# Patient Record
Sex: MALE | Race: WHITE | Employment: UNEMPLOYED | URBAN - METROPOLITAN AREA
[De-identification: names, ages, dates, MRNs, and addresses within clinical notes are randomized per-mention and may not be internally consistent; named-entity substitution may affect disease eponyms.]

---

## 2020-12-19 ENCOUNTER — HOSPITAL ENCOUNTER (INPATIENT)
Age: 20
LOS: 3 days | Discharge: HOME | DRG: 137 | End: 2020-12-22
Attending: EMERGENCY MEDICINE | Admitting: STUDENT IN AN ORGANIZED HEALTH CARE EDUCATION/TRAINING PROGRAM
Payer: MEDICAID

## 2020-12-19 DIAGNOSIS — R45.851 SUICIDAL IDEATION: Primary | ICD-10-CM

## 2020-12-19 DIAGNOSIS — U07.1 COVID-19: ICD-10-CM

## 2020-12-19 LAB
AMPHET UR QL SCN: NEGATIVE
ANION GAP SERPL CALC-SCNC: 5 MMOL/L (ref 3–18)
BARBITURATES UR QL SCN: NEGATIVE
BASOPHILS # BLD: 0 K/UL (ref 0–0.1)
BASOPHILS NFR BLD: 0 % (ref 0–2)
BENZODIAZ UR QL: NEGATIVE
BUN SERPL-MCNC: 16 MG/DL (ref 7–18)
BUN/CREAT SERPL: 21 (ref 12–20)
CALCIUM SERPL-MCNC: 9.1 MG/DL (ref 8.5–10.1)
CANNABINOIDS UR QL SCN: NEGATIVE
CHLORIDE SERPL-SCNC: 109 MMOL/L (ref 100–111)
CO2 SERPL-SCNC: 26 MMOL/L (ref 21–32)
COCAINE UR QL SCN: NEGATIVE
COVID-19 RAPID TEST, COVR: DETECTED
CREAT SERPL-MCNC: 0.75 MG/DL (ref 0.6–1.3)
DIFFERENTIAL METHOD BLD: ABNORMAL
EOSINOPHIL # BLD: 0.1 K/UL (ref 0–0.4)
EOSINOPHIL NFR BLD: 1 % (ref 0–5)
ERYTHROCYTE [DISTWIDTH] IN BLOOD BY AUTOMATED COUNT: 12.4 % (ref 11.6–14.5)
ETHANOL SERPL-MCNC: <3 MG/DL (ref 0–3)
GLUCOSE SERPL-MCNC: 93 MG/DL (ref 74–99)
HCT VFR BLD AUTO: 46.7 % (ref 36–48)
HDSCOM,HDSCOM: NORMAL
HGB BLD-MCNC: 16.3 G/DL (ref 13–16)
LYMPHOCYTES # BLD: 3.1 K/UL (ref 0.9–3.6)
LYMPHOCYTES NFR BLD: 47 % (ref 21–52)
MCH RBC QN AUTO: 33.1 PG (ref 24–34)
MCHC RBC AUTO-ENTMCNC: 34.9 G/DL (ref 31–37)
MCV RBC AUTO: 94.7 FL (ref 74–97)
METHADONE UR QL: NEGATIVE
MONOCYTES # BLD: 0.5 K/UL (ref 0.05–1.2)
MONOCYTES NFR BLD: 8 % (ref 3–10)
NEUTS SEG # BLD: 3 K/UL (ref 1.8–8)
NEUTS SEG NFR BLD: 44 % (ref 40–73)
OPIATES UR QL: NEGATIVE
PCP UR QL: NEGATIVE
PLATELET # BLD AUTO: 224 K/UL (ref 135–420)
PMV BLD AUTO: 11.2 FL (ref 9.2–11.8)
POTASSIUM SERPL-SCNC: 3.8 MMOL/L (ref 3.5–5.5)
RBC # BLD AUTO: 4.93 M/UL (ref 4.7–5.5)
SODIUM SERPL-SCNC: 140 MMOL/L (ref 136–145)
SOURCE, COVRS: ABNORMAL
SPECIMEN TYPE, XMCV1T: ABNORMAL
VALPROATE SERPL-MCNC: 75 UG/ML (ref 50–100)
WBC # BLD AUTO: 6.7 K/UL (ref 4.6–13.2)

## 2020-12-19 PROCEDURE — 80048 BASIC METABOLIC PNL TOTAL CA: CPT

## 2020-12-19 PROCEDURE — 80164 ASSAY DIPROPYLACETIC ACD TOT: CPT

## 2020-12-19 PROCEDURE — 99223 1ST HOSP IP/OBS HIGH 75: CPT | Performed by: STUDENT IN AN ORGANIZED HEALTH CARE EDUCATION/TRAINING PROGRAM

## 2020-12-19 PROCEDURE — 85025 COMPLETE CBC W/AUTO DIFF WBC: CPT

## 2020-12-19 PROCEDURE — 87635 SARS-COV-2 COVID-19 AMP PRB: CPT

## 2020-12-19 PROCEDURE — 74011250637 HC RX REV CODE- 250/637: Performed by: STUDENT IN AN ORGANIZED HEALTH CARE EDUCATION/TRAINING PROGRAM

## 2020-12-19 PROCEDURE — 99284 EMERGENCY DEPT VISIT MOD MDM: CPT

## 2020-12-19 PROCEDURE — 65660000000 HC RM CCU STEPDOWN

## 2020-12-19 PROCEDURE — 80307 DRUG TEST PRSMV CHEM ANLYZR: CPT

## 2020-12-19 RX ORDER — QUETIAPINE FUMARATE 25 MG/1
25 TABLET, FILM COATED ORAL
COMMUNITY
End: 2020-12-22

## 2020-12-19 RX ORDER — ENOXAPARIN SODIUM 100 MG/ML
40 INJECTION SUBCUTANEOUS DAILY
Status: DISCONTINUED | OUTPATIENT
Start: 2020-12-20 | End: 2020-12-22 | Stop reason: HOSPADM

## 2020-12-19 RX ORDER — PROMETHAZINE HYDROCHLORIDE 25 MG/1
12.5 TABLET ORAL
Status: DISCONTINUED | OUTPATIENT
Start: 2020-12-19 | End: 2020-12-22 | Stop reason: HOSPADM

## 2020-12-19 RX ORDER — SODIUM CHLORIDE 0.9 % (FLUSH) 0.9 %
5-40 SYRINGE (ML) INJECTION AS NEEDED
Status: DISCONTINUED | OUTPATIENT
Start: 2020-12-19 | End: 2020-12-22 | Stop reason: HOSPADM

## 2020-12-19 RX ORDER — QUETIAPINE FUMARATE 25 MG/1
25 TABLET, FILM COATED ORAL
Status: DISCONTINUED | OUTPATIENT
Start: 2020-12-19 | End: 2020-12-20

## 2020-12-19 RX ORDER — SERTRALINE HYDROCHLORIDE 50 MG/1
150 TABLET, FILM COATED ORAL EVERY EVENING
Status: DISCONTINUED | OUTPATIENT
Start: 2020-12-19 | End: 2020-12-22 | Stop reason: HOSPADM

## 2020-12-19 RX ORDER — ACETAMINOPHEN 650 MG/1
650 SUPPOSITORY RECTAL
Status: DISCONTINUED | OUTPATIENT
Start: 2020-12-19 | End: 2020-12-22 | Stop reason: HOSPADM

## 2020-12-19 RX ORDER — POLYETHYLENE GLYCOL 3350 17 G/17G
17 POWDER, FOR SOLUTION ORAL DAILY PRN
Status: DISCONTINUED | OUTPATIENT
Start: 2020-12-19 | End: 2020-12-22 | Stop reason: HOSPADM

## 2020-12-19 RX ORDER — ACETAMINOPHEN 325 MG/1
650 TABLET ORAL
Status: DISCONTINUED | OUTPATIENT
Start: 2020-12-19 | End: 2020-12-22 | Stop reason: HOSPADM

## 2020-12-19 RX ORDER — ONDANSETRON 2 MG/ML
4 INJECTION INTRAMUSCULAR; INTRAVENOUS
Status: DISCONTINUED | OUTPATIENT
Start: 2020-12-19 | End: 2020-12-22 | Stop reason: HOSPADM

## 2020-12-19 RX ORDER — DIVALPROEX SODIUM 500 MG/1
1500 TABLET, DELAYED RELEASE ORAL
Status: DISCONTINUED | OUTPATIENT
Start: 2020-12-19 | End: 2020-12-20

## 2020-12-19 RX ORDER — SODIUM CHLORIDE 0.9 % (FLUSH) 0.9 %
5-40 SYRINGE (ML) INJECTION EVERY 8 HOURS
Status: DISCONTINUED | OUTPATIENT
Start: 2020-12-19 | End: 2020-12-22 | Stop reason: HOSPADM

## 2020-12-19 RX ADMIN — Medication 10 ML: at 22:00

## 2020-12-19 RX ADMIN — DIVALPROEX SODIUM 1500 MG: 500 TABLET, DELAYED RELEASE ORAL at 22:00

## 2020-12-19 RX ADMIN — QUETIAPINE FUMARATE 25 MG: 25 TABLET ORAL at 21:01

## 2020-12-19 RX ADMIN — Medication 5 ML: at 17:31

## 2020-12-19 RX ADMIN — SERTRALINE HYDROCHLORIDE 150 MG: 50 TABLET ORAL at 18:31

## 2020-12-19 NOTE — ED PROVIDER NOTES
EMERGENCY DEPARTMENT HISTORY AND PHYSICAL EXAM    2:32 PM      Date: 12/19/2020  Patient Name: Hilda Sotelo    History of Presenting Illness     Chief Complaint   Patient presents with    Suicidal         History Provided By: Patient    Additional History (Context): Hilda Sotelo is a 23 y.o. male with hx of depression, anxiety who presents with suicidal ideation x weeks that became worse today. Patient notes his mother told him he cannot move back to [place] today, which caused increased suicidal ideation. Patient's plan would be to hang himself. Patient has history of self-harm in the past.  Patient denies HI, visual or auditory hallucinations, alcohol or drug use. Denies sick contacts, known exposure to [place]. PCP: None      Past History     Past Medical History:  No past medical history on file. Past Surgical History:  No past surgical history on file. Family History:  No family history on file. Social History:  Social History     Tobacco Use    Smoking status: Not on file   Substance Use Topics    Alcohol use: Not on file    Drug use: Not on file       Allergies:  No Known Allergies      Review of Systems       Review of Systems   Constitutional: Negative for chills and fever. Respiratory: Negative for shortness of breath. Cardiovascular: Negative for chest pain. Gastrointestinal: Negative for abdominal pain, nausea and vomiting. Skin: Negative for rash. Neurological: Negative for weakness. Psychiatric/Behavioral: Positive for suicidal ideas. All other systems reviewed and are negative. Physical Exam     Visit Vitals  BP (!) 141/91 (BP 1 Location: Left arm, BP Patient Position: At rest)   Pulse 70   Temp 98.1 °F (36.7 °C)   Resp 16   Ht 6' 1\" (1.854 m)   Wt 124.3 kg (274 lb)   SpO2 99%   BMI 36.15 kg/m²         Physical Exam  Vitals signs and nursing note reviewed. Constitutional:       General: He is not in acute distress. Appearance: Normal appearance.  He is well-developed. He is not ill-appearing, toxic-appearing or diaphoretic. HENT:      Head: Normocephalic and atraumatic. Neck:      Musculoskeletal: Normal range of motion and neck supple. Cardiovascular:      Rate and Rhythm: Normal rate and regular rhythm. Heart sounds: Normal heart sounds. No murmur. No friction rub. No gallop. Pulmonary:      Effort: Pulmonary effort is normal. No respiratory distress. Breath sounds: Normal breath sounds. No wheezing or rales. Musculoskeletal: Normal range of motion. Skin:     General: Skin is warm. Findings: No rash. Neurological:      Mental Status: He is alert. Psychiatric:         Attention and Perception: Attention normal.         Mood and Affect: Affect is blunt. Speech: Speech normal.         Behavior: Behavior is withdrawn. Thought Content: Thought content includes suicidal ideation. Thought content does not include homicidal ideation. Thought content includes suicidal plan. Thought content does not include homicidal plan. Diagnostic Study Results     Labs -  Recent Results (from the past 12 hour(s))   ETHYL ALCOHOL    Collection Time: 12/19/20  2:46 PM   Result Value Ref Range    ALCOHOL(ETHYL),SERUM <3 0 - 3 MG/DL   CBC WITH AUTOMATED DIFF    Collection Time: 12/19/20  2:46 PM   Result Value Ref Range    WBC 6.7 4.6 - 13.2 K/uL    RBC 4.93 4.70 - 5.50 M/uL    HGB 16.3 (H) 13.0 - 16.0 g/dL    HCT 46.7 36.0 - 48.0 %    MCV 94.7 74.0 - 97.0 FL    MCH 33.1 24.0 - 34.0 PG    MCHC 34.9 31.0 - 37.0 g/dL    RDW 12.4 11.6 - 14.5 %    PLATELET 974 485 - 942 K/uL    MPV 11.2 9.2 - 11.8 FL    NEUTROPHILS 44 40 - 73 %    LYMPHOCYTES 47 21 - 52 %    MONOCYTES 8 3 - 10 %    EOSINOPHILS 1 0 - 5 %    BASOPHILS 0 0 - 2 %    ABS. NEUTROPHILS 3.0 1.8 - 8.0 K/UL    ABS. LYMPHOCYTES 3.1 0.9 - 3.6 K/UL    ABS. MONOCYTES 0.5 0.05 - 1.2 K/UL    ABS. EOSINOPHILS 0.1 0.0 - 0.4 K/UL    ABS.  BASOPHILS 0.0 0.0 - 0.1 K/UL    DF AUTOMATED METABOLIC PANEL, BASIC    Collection Time: 12/19/20  2:46 PM   Result Value Ref Range    Sodium 140 136 - 145 mmol/L    Potassium 3.8 3.5 - 5.5 mmol/L    Chloride 109 100 - 111 mmol/L    CO2 26 21 - 32 mmol/L    Anion gap 5 3.0 - 18 mmol/L    Glucose 93 74 - 99 mg/dL    BUN 16 7.0 - 18 MG/DL    Creatinine 0.75 0.6 - 1.3 MG/DL    BUN/Creatinine ratio 21 (H) 12 - 20      GFR est AA >60 >60 ml/min/1.73m2    GFR est non-AA >60 >60 ml/min/1.73m2    Calcium 9.1 8.5 - 10.1 MG/DL   DRUG SCREEN, URINE    Collection Time: 12/19/20  2:50 PM   Result Value Ref Range    BENZODIAZEPINES Negative NEG      BARBITURATES Negative NEG      THC (TH-CANNABINOL) Negative NEG      OPIATES Negative NEG      PCP(PHENCYCLIDINE) Negative NEG      COCAINE Negative NEG      AMPHETAMINES Negative NEG      METHADONE Negative NEG      HDSCOM (NOTE)    SARS-COV-2    Collection Time: 12/19/20  3:48 PM   Result Value Ref Range    Specimen source Nasopharyngeal      COVID-19 rapid test Detected (AA) NOTD      Specimen type NP Swab         Radiologic Studies -   No orders to display         Medical Decision Making   I am the first provider for this patient. I reviewed the vital signs, available nursing notes, past medical history, past surgical history, family history and social history. Vital Signs-Reviewed the patient's vital signs. Records Reviewed: Nursing Notes and Old Medical Records (Time of Review: 2:32 PM)    ED Course: Progress Notes, Reevaluation, and Consults:  3:39 PM: Spoke with [facility], crisis. 4:15 PM: Spoke with [facility] crisis, will admit patient. 4:33 PM: COVID +. Charge nurse updated crisis. Crisis notes patient must be admitted to a medical bed. Discussed case with Dr. Odilia Salazar, hospitalist, accepts admission. Provider Notes (Medical Decision Making): 56-year-old male who presents to the ED due to suicidal ideation with a plan. Medically evaluated in the emergency department, crisis evaluated patient.   COVID positive. Denies any symptoms. Afebrile, nontoxic-appearing, looks well, 99% on room air. Will admit to medical floor with psychiatry consultation. Diagnosis     Clinical Impression:   1. Suicidal ideation    2. COVID-19        Disposition: admission     Follow-up Information    None          Patient's Medications    No medications on file       Dictation disclaimer:  Please note that this dictation was completed with [employer], the computer voice recognition software. Quite often unanticipated grammatical, syntax, homophones, and other interpretive errors are inadvertently transcribed by the computer software. Please disregard these errors. Please excuse any errors that have escaped final proofreading.

## 2020-12-19 NOTE — H&P
History and Physical    Patient: Payton Can               Sex: male          DOA: 12/19/2020       YOB: 2000      Age:  23 y.o.        LOS:  LOS: 0 days        HPI:     Payton Can is a 23 y.o. male with history of bipolar disorder, depression, and past suicidal ideation who presents with active suicidal ideation with plan. Patient states that he has recently been more depressed than usual. He has not been able to find a job. Today, his mother told him that he could not move back in with her. This spiked his depression and brought about suicidal ideation. Patient had a plan to jump off a bridge to kill himself. ER consulted the crisis hotline who recommended admission to psych unit. Unfortunately, patient tested positive for COVID-19. His only current symptom is loss of smell/taste. Hospitalist service consulted for admission to the hospital with psych consult. Past Medical History:   Diagnosis Date    Bipolar 1 disorder ([facility] [address]     Depression      History reviewed. No pertinent surgical history. History reviewed. No pertinent family history. Social History     Tobacco Use    Smoking status: Not on file   Substance Use Topics    Alcohol use: Not on file      Prior to Admission medications    Not on File        Allergies   Allergen Reactions    Milk Unknown (comments)       Review of Systems:    Negative Unless BOLDED    Constitutional: Fever, chills,diaphoresis. HENT: Negative for congestion, rhinorrhea, sore throat and trouble swallowing. Loss of smell/taste  Eyes: Negative for visual disturbance. Respiratory: Cough,shortness of breath, wheezing. Cardiovascular: Chest pain, palpitations   Gastrointestinal: Abdominal pain, blood in stool, constipation, diarrhea, nausea and vomiting. Endocrine: Polyuria. Genitourinary: Difficulty urinating and dysuria. Musculoskeletal: Arthralgias and neck stiffness. Skin: Pallor, rash.    Neurological: Dizziness, weakness, numbness and headaches. Hematological: Bruise/bleed easily   Psychiatric/Behavioral: Confusion, dysphoric mood, hallucinations Suicidal ideation  All other systems reviewed and are negative. Physical Exam:      Vitals:    20 1429   BP: (!) 141/91   Pulse: 70   Resp: 16   Temp: 98.1 °F (36.7 °C)   SpO2: 99%   Weight: 124.3 kg (274 lb)   Height: 6' 1\" (1.854 m)      Temp (24hrs), Av.1 °F (36.7 °C), Min:98.1 °F (36.7 °C), Max:98.1 °F (36.7 °C)      General:   awake alert and oriented   Skin:   no rashes or skin lesions noted on limited exam   HEENT:  Normocephalic, atraumatic, PERRL, EOMI, no scleral icterus or pallor; no conjunctival hemmohage; mask in place       Lungs:   non-labored, bilaterally clear to aspiration- no crackles wheezes rales or rhonchi   Heart:  RRR, s1 and s2; no murmurs rubs or gallops, no edema   Abdomen:  soft, non-distended, active bowel sounds, Non-tender   Genitourinary:  deferred   Extremities:   no joint effusions or swelling; Full ROM of all large joints to the upper and lower extremities; muscle mass appropriate for age   Neurologic:  No gross focal sensory abnormalities; 5/5 muscle strength to upper and lower extremities. Speech appropirate. Cranial nerves grossly intact    Psychiatric:   Sad, admits to active suicidal ideation.        Labs Reviewed:    Recent Results (from the past 24 hour(s))   ETHYL ALCOHOL    Collection Time: 20  2:46 PM   Result Value Ref Range    ALCOHOL(ETHYL),SERUM <3 0 - 3 MG/DL   CBC WITH AUTOMATED DIFF    Collection Time: 20  2:46 PM   Result Value Ref Range    WBC 6.7 4.6 - 13.2 K/uL    RBC 4.93 4.70 - 5.50 M/uL    HGB 16.3 (H) 13.0 - 16.0 g/dL    HCT 46.7 36.0 - 48.0 %    MCV 94.7 74.0 - 97.0 FL    MCH 33.1 24.0 - 34.0 PG    MCHC 34.9 31.0 - 37.0 g/dL    RDW 12.4 11.6 - 14.5 %    PLATELET 295 325 - 245 K/uL    MPV 11.2 9.2 - 11.8 FL    NEUTROPHILS 44 40 - 73 %    LYMPHOCYTES 47 21 - 52 %    MONOCYTES 8 3 - 10 %    EOSINOPHILS 1 0 - 5 % BASOPHILS 0 0 - 2 %    ABS. NEUTROPHILS 3.0 1.8 - 8.0 K/UL    ABS. LYMPHOCYTES 3.1 0.9 - 3.6 K/UL    ABS. MONOCYTES 0.5 0.05 - 1.2 K/UL    ABS. EOSINOPHILS 0.1 0.0 - 0.4 K/UL    ABS. BASOPHILS 0.0 0.0 - 0.1 K/UL    DF AUTOMATED     METABOLIC PANEL, BASIC    Collection Time: 12/19/20  2:46 PM   Result Value Ref Range    Sodium 140 136 - 145 mmol/L    Potassium 3.8 3.5 - 5.5 mmol/L    Chloride 109 100 - 111 mmol/L    CO2 26 21 - 32 mmol/L    Anion gap 5 3.0 - 18 mmol/L    Glucose 93 74 - 99 mg/dL    BUN 16 7.0 - 18 MG/DL    Creatinine 0.75 0.6 - 1.3 MG/DL    BUN/Creatinine ratio 21 (H) 12 - 20      GFR est AA >60 >60 ml/min/1.73m2    GFR est non-AA >60 >60 ml/min/1.73m2    Calcium 9.1 8.5 - 10.1 MG/DL   DRUG SCREEN, URINE    Collection Time: 12/19/20  2:50 PM   Result Value Ref Range    BENZODIAZEPINES Negative NEG      BARBITURATES Negative NEG      THC (TH-CANNABINOL) Negative NEG      OPIATES Negative NEG      PCP(PHENCYCLIDINE) Negative NEG      COCAINE Negative NEG      AMPHETAMINES Negative NEG      METHADONE Negative NEG      HDSCOM (NOTE)    SARS-COV-2    Collection Time: 12/19/20  3:48 PM   Result Value Ref Range    Specimen source Nasopharyngeal      COVID-19 rapid test Detected (AA) NOTD      Specimen type NP Swab        Imaging:  CT Results  (Last 48 hours)    None           CXR Results  (Last 48 hours)    None           Assessment/Plan     71-year-old obese man with reported history of bipolar disorder, depression, past suicidal ideation is now admitted for suicidal ideation with plan. Patient was found to have COVID-19 infection during admission process. His only symptom is loss of taste/smell. From a COVID-19 standpoint, patient would be sent home and treated as an outpatient. However, due to his suicidal ideation and concurrence of COVID-19, patient is not able to be admitted to psychiatric facility. I have discussed with nursing supervisor and hospitalist staff.   Plan to admit patient to hospital and have psychiatry see as a consult. Active Problems:    Suicidal ideation (12/19/2020)      COVID-19 (12/19/2020)    Obesity    Self-mutilation -cutting    PLAN:    -Psychiatry consult in the a.m., please call, I was not able to get a hold over the telephone  -One-to-one sitter  -Droplet plus precautions  -Continuing home medications    Activity: As tolerated with assistance  Diet: Regular  Antibiotics: None  DVT prophylaxis: Lovenox  CODE status: FULL    Disposition: Remain inpatient for psychiatry evaluation    Signed By: Wang Mcclelland MD   [facility]    December 19, 2020      Dragon voice recognition software was used for parts of this note. Unintended errors may have occurred.
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
## 2020-12-19 NOTE — ROUTINE PROCESS
TRANSFER - OUT REPORT: 
 
Verbal report given to [facility], RN on Agustín Galvez  being transferred to [address] [place] [address] for routine progression of care Report consisted of patients Situation, Background, Assessment and  
Recommendations(SBAR). Information from the following report(s) SBAR, ED Summary and MAR was reviewed with the receiving nurse. Lines:    
 
Opportunity for questions and clarification was provided. Patient transported with: 
 [employer]

## 2020-12-20 PROBLEM — F31.30 BIPOLAR I DISORDER, MOST RECENT EPISODE DEPRESSED (HCC): Status: ACTIVE | Noted: 2020-12-20

## 2020-12-20 PROCEDURE — 74011250636 HC RX REV CODE- 250/636: Performed by: STUDENT IN AN ORGANIZED HEALTH CARE EDUCATION/TRAINING PROGRAM

## 2020-12-20 PROCEDURE — 99221 1ST HOSP IP/OBS SF/LOW 40: CPT | Performed by: PSYCHIATRY & NEUROLOGY

## 2020-12-20 PROCEDURE — 74011250637 HC RX REV CODE- 250/637: Performed by: STUDENT IN AN ORGANIZED HEALTH CARE EDUCATION/TRAINING PROGRAM

## 2020-12-20 PROCEDURE — 99232 SBSQ HOSP IP/OBS MODERATE 35: CPT | Performed by: FAMILY MEDICINE

## 2020-12-20 PROCEDURE — 74011250637 HC RX REV CODE- 250/637: Performed by: PSYCHIATRY & NEUROLOGY

## 2020-12-20 PROCEDURE — 65660000000 HC RM CCU STEPDOWN

## 2020-12-20 RX ORDER — DIVALPROEX SODIUM 500 MG/1
1500 TABLET, EXTENDED RELEASE ORAL
Status: DISCONTINUED | OUTPATIENT
Start: 2020-12-20 | End: 2020-12-22 | Stop reason: HOSPADM

## 2020-12-20 RX ADMIN — Medication 10 ML: at 05:05

## 2020-12-20 RX ADMIN — DIVALPROEX SODIUM 1500 MG: 500 TABLET, EXTENDED RELEASE ORAL at 20:16

## 2020-12-20 RX ADMIN — ENOXAPARIN SODIUM 40 MG: 40 INJECTION SUBCUTANEOUS at 09:13

## 2020-12-20 RX ADMIN — SERTRALINE HYDROCHLORIDE 150 MG: 50 TABLET ORAL at 20:16
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
## 2020-12-20 NOTE — PROGRESS NOTES
Problem: Suicide  Goal: *STG: Remains safe in hospital  Outcome: Progressing Towards Goal  Goal: *STG: Seeks staff when feelings of self harm or harm towards others arise  Outcome: Progressing Towards Goal  Goal: *STG: Attends activities and groups  Outcome: Progressing Towards Goal  Goal: *STG:  Verbalizes alternative ways of dealing with maladaptive feelings/behaviors  Outcome: Progressing Towards Goal  Goal: *STG/LTG: Complies with medication therapy  Outcome: Progressing Towards Goal  Goal: *STG/LTG: No longer expresses self destructive or suicidal thoughts  Outcome: Progressing Towards Goal  Goal: *LTG:  Identifies available community resources  Outcome: Progressing Towards Goal  Goal: *LTG:  Develops proactive suicide prevention plan  Outcome: Progressing Towards Goal  Goal: Interventions  Outcome: Progressing Towards Goal  Note: Sitter outside room due to Covid isolation. Pt. Stated at beginning of shift \"still sad and depressed about situation. \" toward end of shift pt. Reported feeling better and not having suicidal ideations at this time. Problem: Falls - Risk of  Goal: *Absence of Falls  Description: Document Annemarie Bark Fall Risk and appropriate interventions in the flowsheet. Outcome: Progressing Towards Goal  Note: Fall Risk Interventions:    Medication Interventions: Teach patient to arise slowly    No falls this shift. Nonskid footwear and safety measures maintained. bed and chair wheels locked, bed in lowest position. Pt. Up ad aster in room. Pt. Spent time upright in chair, well tolerated. Problem: Airway Clearance - Ineffective  Goal: Achieve or maintain patent airway  Outcome: Progressing Towards Goal  Note: Pt. Remain on droplet plus isolation. Pt. Remain on room air, no SOB or cough noted. Pt. Reports still not having sense of taste. Pt. A&Ox4 throughout day. No tele. No PIV access. No BM this shift. Pt. Ambulated independently to bathroom, well tolerated.  Pt. Denies chest pain, dizziness or N/V. Lovenox for DVT prophylactic. Regular diet remain, good appetite. Will continue to monitor at this time.      Patient Vitals for the past 12 hrs:   Temp Pulse Resp BP SpO2   12/20/20 1449 97.2 °F (36.2 °C) 67 18 124/65 98 %   12/20/20 1126 97.5 °F (36.4 °C) 70 18 (!) 126/93 98 %   12/20/20 0755 97.2 °F (36.2 °C) 80 18 136/74 99 %

## 2020-12-20 NOTE — PROGRESS NOTES
[facility]  Progress Note    Patient: Agustín Galvez Age: 23 y.o. : 2000 MR#: [id number] SSN: [id number]  Date: 2020     Subjective/24-hour events:     No HI or SI currently. No SOB, CP, N/V/D. Still has loss of smell/taste. Major complaint is that of being bored. Assessment:   COVID-19 infection, currently asymptomatic  Suicidal ideation  Bipolar disorder  Obesity, BMI 36.2    Plan:   Psychiatry evaluation/recs appreciated. Further plan pending course. Cannot be admitted to inpatient BMU due to positive COVID status. Continue 1:1 watch for now. Supportive care o/w. Case discussed with:  [x]Patient  []Family  [x]Nursing  []Case Management  DVT Prophylaxis:  [x]Lovenox  []Hep SQ  []SCDs  []Coumadin   []On Heparin gtt    Objective:   VS:   Visit Vitals  /65 (BP 1 Location: Right arm, BP Patient Position: At rest)   Pulse 67   Temp 97.2 °F (36.2 °C)   Resp 18   Ht 6' 1\" (1.854 m)   Wt 124.3 kg (274 lb)   SpO2 98%   BMI 36.15 kg/m²      Tmax/24hrs: Temp (24hrs), Av.3 °F (36.3 °C), Min:96.7 °F (35.9 °C), Max:97.9 °F (36.6 °C)      Intake/Output Summary (Last 24 hours) at 2020 1724  Last data filed at 2020 1411  Gross per 24 hour   Intake 480 ml   Output 3 ml   Net 477 ml       General:  In NAD. Nontoxic-appearing. Cardiovascular:  RRR. Pulmonary:  Lungs clear bilaterally, no wheezes. GI:  Abdomen soft, NTTP. Extremities:  Warm, no edema or ischemia. Neuro:  Awake and alert. Motor nonfocal.    Labs:    No results found for this or any previous visit (from the past 24 hour(s)).     Signed By: Papito Cantrell MD     2020

## 2020-12-21 LAB
ALBUMIN SERPL-MCNC: 3.4 G/DL (ref 3.4–5)
ALBUMIN/GLOB SERPL: 0.9 {RATIO} (ref 0.8–1.7)
ALP SERPL-CCNC: 65 U/L (ref 45–117)
ALT SERPL-CCNC: 41 U/L (ref 16–61)
AST SERPL-CCNC: 19 U/L (ref 10–38)
BILIRUB DIRECT SERPL-MCNC: 0.1 MG/DL (ref 0–0.2)
BILIRUB SERPL-MCNC: 0.5 MG/DL (ref 0.2–1)
CHOLEST SERPL-MCNC: 148 MG/DL
GLOBULIN SER CALC-MCNC: 3.8 G/DL (ref 2–4)
HBA1C MFR BLD: 5.1 % (ref 4.2–5.6)
HDLC SERPL-MCNC: 30 MG/DL (ref 40–60)
HDLC SERPL: 4.9 {RATIO} (ref 0–5)
LDLC SERPL CALC-MCNC: 70.4 MG/DL (ref 0–100)
LIPID PROFILE,FLP: ABNORMAL
PROT SERPL-MCNC: 7.2 G/DL (ref 6.4–8.2)
TRIGL SERPL-MCNC: 238 MG/DL (ref ?–150)
TSH SERPL DL<=0.05 MIU/L-ACNC: 5.88 UIU/ML (ref 0.36–3.74)
VLDLC SERPL CALC-MCNC: 47.6 MG/DL

## 2020-12-21 PROCEDURE — 83036 HEMOGLOBIN GLYCOSYLATED A1C: CPT

## 2020-12-21 PROCEDURE — 80076 HEPATIC FUNCTION PANEL: CPT

## 2020-12-21 PROCEDURE — 74011250636 HC RX REV CODE- 250/636: Performed by: EMERGENCY MEDICINE

## 2020-12-21 PROCEDURE — 84443 ASSAY THYROID STIM HORMONE: CPT

## 2020-12-21 PROCEDURE — 74011250637 HC RX REV CODE- 250/637: Performed by: STUDENT IN AN ORGANIZED HEALTH CARE EDUCATION/TRAINING PROGRAM

## 2020-12-21 PROCEDURE — 90686 IIV4 VACC NO PRSV 0.5 ML IM: CPT | Performed by: EMERGENCY MEDICINE

## 2020-12-21 PROCEDURE — 80061 LIPID PANEL: CPT

## 2020-12-21 PROCEDURE — 36415 COLL VENOUS BLD VENIPUNCTURE: CPT

## 2020-12-21 PROCEDURE — 65660000000 HC RM CCU STEPDOWN

## 2020-12-21 PROCEDURE — 74011250637 HC RX REV CODE- 250/637: Performed by: PSYCHIATRY & NEUROLOGY

## 2020-12-21 PROCEDURE — 74011250636 HC RX REV CODE- 250/636: Performed by: STUDENT IN AN ORGANIZED HEALTH CARE EDUCATION/TRAINING PROGRAM

## 2020-12-21 PROCEDURE — 90471 IMMUNIZATION ADMIN: CPT

## 2020-12-21 PROCEDURE — 99231 SBSQ HOSP IP/OBS SF/LOW 25: CPT | Performed by: PSYCHIATRY & NEUROLOGY

## 2020-12-21 PROCEDURE — 99232 SBSQ HOSP IP/OBS MODERATE 35: CPT | Performed by: FAMILY MEDICINE

## 2020-12-21 RX ORDER — CHOLECALCIFEROL (VITAMIN D3) 125 MCG
5 CAPSULE ORAL
Status: DISCONTINUED | OUTPATIENT
Start: 2020-12-21 | End: 2020-12-22 | Stop reason: RX

## 2020-12-21 RX ADMIN — Medication 5 MG: at 22:47

## 2020-12-21 RX ADMIN — ENOXAPARIN SODIUM 40 MG: 40 INJECTION SUBCUTANEOUS at 08:02

## 2020-12-21 RX ADMIN — INFLUENZA VIRUS VACCINE 0.5 ML: 15; 15; 15; 15 SUSPENSION INTRAMUSCULAR at 10:14

## 2020-12-21 RX ADMIN — CARIPRAZINE 1.5 MG: 1.5 CAPSULE, GELATIN COATED ORAL at 08:02

## 2020-12-21 RX ADMIN — DIVALPROEX SODIUM 1500 MG: 500 TABLET, EXTENDED RELEASE ORAL at 22:47

## 2020-12-21 RX ADMIN — SERTRALINE HYDROCHLORIDE 150 MG: 50 TABLET ORAL at 22:47

## 2020-12-21 RX ADMIN — Medication 10 ML: at 13:03

## 2020-12-21 NOTE — PROGRESS NOTES
Problem: Suicide  Goal: *STG: Remains safe in hospital  Outcome: Progressing Towards Goal  Goal: *STG: Seeks staff when feelings of self harm or harm towards others arise  Outcome: Progressing Towards Goal  Goal: *STG: Attends activities and groups  Outcome: Progressing Towards Goal  Goal: *STG:  Verbalizes alternative ways of dealing with maladaptive feelings/behaviors  Outcome: Progressing Towards Goal  Goal: *STG/LTG: Complies with medication therapy  Outcome: Progressing Towards Goal  Goal: *STG/LTG: No longer expresses self destructive or suicidal thoughts  Outcome: Progressing Towards Goal  Goal: *LTG:  Identifies available community resources  Outcome: Progressing Towards Goal  Goal: *LTG:  Develops proactive suicide prevention plan  Outcome: Progressing Towards Goal  Goal: Interventions  Outcome: Progressing Towards Goal  Note: Sitter outside room due to Covid isolation. pt. Reported feeling better and not having suicidal ideations at this time. Problem: Falls - Risk of  Goal: *Absence of Falls  Description: Document Marie Fields Fall Risk and appropriate interventions in the flowsheet. Outcome: Progressing Towards Goal  Note: Fall Risk Interventions:     Medication Interventions: Teach patient to arise slowly     No falls this shift. Nonskid footwear and safety measures maintained. bed and chair wheels locked, bed in lowest position. Pt. Up ad aster in room. Pt. Spent time upright in chair, well tolerated.         Problem: Airway Clearance - Ineffective  Goal: Achieve or maintain patent airway  Outcome: Progressing Towards Goal  Note: Pt. Remain on droplet plus isolation. Pt. Remain on room air, no SOB or cough noted. Pt. Reports still not having sense of taste.         Pt. A&Ox4 throughout day. No tele. No PIV access. No BM this shift. Pt. Ambulated independently to bathroom, well tolerated. Pt. Denies chest pain, dizziness or N/V. Low BP noted at lunchtime, this RN rechecked BP, BP stable and pt. Asymptomatic. See chart below. Lovenox for DVT prophylactic. Regular diet remain, good appetite. Will continue to monitor at this time.      Patient Vitals for the past 12 hrs:   Temp Pulse Resp BP SpO2   12/21/20 1531 96.9 °F (36.1 °C) 67 16 119/67 98 %   12/21/20 1118    128/70    12/21/20 1105 97.3 °F (36.3 °C) 65 16 (!) 99/55 98 %   12/21/20 0738 97 °F (36.1 °C) 72 16 117/71 98 %

## 2020-12-21 NOTE — ROUTINE PROCESS
Bedside and Verbal shift change report given to Arminda Purcell (oncoming nurse) by Loyd Martinez (off going nurse). Report included the following information, SBAR, MAR, VS and summary of care. Patient quietly resting, sitter observing.

## 2020-12-21 NOTE — PROGRESS NOTES
Bedside shift change report given to yenni (oncoming nurse) by Long Delarosa (offgoing nurse). Report included the following information SBAR, Intake/Output, MAR and Cardiac Rhythm none.

## 2020-12-21 NOTE — PROGRESS NOTES
Patient came in with suicidal ideations, and then tested positive for COVID-19 so unable to be admitted to psych. Currently on droplet plus precautions with sitter. Patient reports having no where to go and no money. Cannot be discharged to a shelter being COVID-19+, unable to determine d/c plan at this time.        OSCAR Leon  Case Management  [phone]

## 2020-12-21 NOTE — PROGRESS NOTES
[address]  Inpatient Progress Note     Date of Service: 12/21/20  Hospital Day: 2     Subjective/Interval History   12/21/20    Treatment Team Notes:  Notes reviewed and/or discussed and report that Kaitlyn Franco is a patient with a history of bipolar illness most recent episode depressed without psychotic symptoms. Attention is invited to the psychiatric consultation report which is self-explanatory. Patient interview: Kaitlyn Franco was interviewed by this writer today. Is still requiring a sitter the patient remains depressed however responding in our opinion appropriately to current treatment. The patient was admitted due to his being suicidal, with multiple social and family being the apparent trigger. However there is some evidence of improvement at present. The case was discussed with Dr. Kaleb Scott. Not clear as to when they necessary tests to confirm the Covid diagnosis will be drawn. However for us to be able to transfer the patient to the psychiatric unit he will have to have to negative test results. Otherwise he is tolerating treatment with Arlina Skye very well. It is too soon to increase the dose however we are hopeful that the patient will respond very well to these atypical.  I did notice when some labs were performed that when we order a TSH for completeness it came back elevated with the possibility of hypothyroidism to be considered. I notify Dr. Kaleb Scott will look into that. Otherwise his A1c is 5.1% which is very good and his lipid panel even though it shows elevated triglycerides and a low HDL, is otherwise okay.       Objective     Visit Vitals  /70 (BP 1 Location: Right arm, BP Patient Position: At rest)   Pulse 65   Temp 97.3 °F (36.3 °C)   Resp 16   Ht 6' 1\" (1.854 m)   Wt 124.3 kg (274 lb)   SpO2 98%   BMI 36.15 kg/m²       Recent Results (from the past 24 hour(s))   HEMOGLOBIN A1C W/O EAG    Collection Time: 12/21/20  1:36 AM   Result Value Ref Range Hemoglobin A1c 5.1 4.2 - 5.6 %   HEPATIC FUNCTION PANEL    Collection Time: 12/21/20  1:36 AM   Result Value Ref Range    Protein, total 7.2 6.4 - 8.2 g/dL    Albumin 3.4 3.4 - 5.0 g/dL    Globulin 3.8 2.0 - 4.0 g/dL    A-G Ratio 0.9 0.8 - 1.7      Bilirubin, total 0.5 0.2 - 1.0 MG/DL    Bilirubin, direct 0.1 0.0 - 0.2 MG/DL    Alk. phosphatase 65 45 - 117 U/L    AST (SGOT) 19 10 - 38 U/L    ALT (SGPT) 41 16 - 61 U/L   LIPID PANEL    Collection Time: 12/21/20  1:36 AM   Result Value Ref Range    LIPID PROFILE          Cholesterol, total 148 <200 MG/DL    Triglyceride 238 (H) <150 MG/DL    HDL Cholesterol 30 (L) 40 - 60 MG/DL    LDL, calculated 70.4 0 - 100 MG/DL    VLDL, calculated 47.6 MG/DL    CHOL/HDL Ratio 4.9 0 - 5.0     TSH 3RD GENERATION    Collection Time: 12/21/20  1:36 AM   Result Value Ref Range    TSH 5.88 (H) 0.36 - 3.74 uIU/mL       Mental Status Examination     Appearance/Hygiene 23 y.o. WHITE OR  male  Hygiene: Fair   Behavior/Social Relatedness Appropriate   Musculoskeletal Gait/Station: appropriate  Tone (flaccid, cogwheeling, spastic): not assessed  Psychomotor (hyperkinetic, hypokinetic): calm   Involuntary movements (tics, dyskinesias, akathisa, stereotypies): none   Speech   Rate, rhythm, volume, fluency and articulation are appropriate   Mood   depressed   Affect    appropriate to situation   Thought Process Linear and goal directed   Thought Content and Perceptual Disturbances Denies auditory and visual hallucinations. Suicidal thoughts are less intense and less frequent.    Sensorium and Cognition  Grossly intact   Insight  fair   Judgment  fair        Assessment/Plan      Psychiatric Diagnoses:   Patient Active Problem List   Diagnosis Code    Suicidal ideation R45.851    COVID-19 U07.1    Bipolar I disorder, most recent episode depressed ([facility] [address] F31.30       Medical Diagnoses: Possible hypothyroidism otherwise the same    Psychosocial and contextual factors: Same    Level of impairment/disability: Moderately severe      1. We will maintain current combination with Vraylar and Depakote the same for now. The patient is also being prescribed with sertraline 150 mg daily. It appears that even though he has a history of bipolar disorder prescribing antidepressants for him had not been an issue. Specifically he has not responded to a prescription with hypomania or giuseppe. So for now we will maintain the same combination. 2.  Reviewed instructions, risks, benefits and side effects of medications  3. Disposition/Discharge Date: self-care/home, to be determined.     Ran Kelley MD, [address] [place] [address]  Psychiatry

## 2020-12-21 NOTE — PROGRESS NOTES
[facility]    Patient: Payton Can Age: 23 y.o. : 2000 MR#: [id number] SSN: [id number]  Date: 2020     Subjective/24-hour events:     Unchanged clinically, remains without SOB, respiratory status stable. Afebrile, no N/V/D. Continues to deny homicidal/suicidal ideations at this point. Assessment:   COVID-19 infection  Suicidal ideation  Bipolar disorder  Obesity, BMI 36.2    Plan:   Disposition TBD. Patient without any resources and cannot be admitted to [facility] or be discharged to shelter given COVID-positive status. Continue sitter for now and await psychiatry follow up. Discussed with CM on unit. Case discussed with:  [x]Patient  []Family  [x]Nursing  [x]Case Management  DVT Prophylaxis:  [x]Lovenox  []Hep SQ  []SCDs  []Coumadin   []On Heparin gtt    Objective:   VS:   Visit Vitals  /71 (BP 1 Location: Left arm, BP Patient Position: At rest)   Pulse 72   Temp 97 °F (36.1 °C)   Resp 16   Ht 6' 1\" (1.854 m)   Wt 124.3 kg (274 lb)   SpO2 98%   BMI 36.15 kg/m²      Tmax/24hrs: Temp (24hrs), Av.3 °F (36.3 °C), Min:96.8 °F (36 °C), Max:97.6 °F (36.4 °C)      Intake/Output Summary (Last 24 hours) at 2020 0949  Last data filed at 2020 2200  Gross per 24 hour   Intake 840 ml   Output 3 ml   Net 837 ml       General:  In NAD. Nontoxic-appearing. Cardiovascular:  RRR. Pulmonary:  Lungs clear bilaterally, no wheezes. GI:  Abdomen soft, NTTP. Extremities:  Warm, no edema or ischemia. Neuro:  Awake and alert.   Motor nonfocal.    Labs:    Recent Results (from the past 24 hour(s))   HEMOGLOBIN A1C W/O EAG    Collection Time: 20  1:36 AM   Result Value Ref Range    Hemoglobin A1c 5.1 4.2 - 5.6 %   HEPATIC FUNCTION PANEL    Collection Time: 20  1:36 AM   Result Value Ref Range    Protein, total 7.2 6.4 - 8.2 g/dL    Albumin 3.4 3.4 - 5.0 g/dL    Globulin 3.8 2.0 - 4.0 g/dL    A-G Ratio 0.9 0.8 - 1.7      Bilirubin, total 0.5 0.2 - 1.0 MG/DL    Bilirubin, direct 0.1 0.0 - 0.2 MG/DL    Alk.  phosphatase 65 45 - 117 U/L    AST (SGOT) 19 10 - 38 U/L    ALT (SGPT) 41 16 - 61 U/L   LIPID PANEL    Collection Time: 12/21/20  1:36 AM   Result Value Ref Range    LIPID PROFILE          Cholesterol, total 148 <200 MG/DL    Triglyceride 238 (H) <150 MG/DL    HDL Cholesterol 30 (L) 40 - 60 MG/DL    LDL, calculated 70.4 0 - 100 MG/DL    VLDL, calculated 47.6 MG/DL    CHOL/HDL Ratio 4.9 0 - 5.0     TSH 3RD GENERATION    Collection Time: 12/21/20  1:36 AM   Result Value Ref Range    TSH 5.88 (H) 0.36 - 3.74 uIU/mL       Signed By: Samir Dutta MD     December 21, 2020

## 2020-12-22 VITALS
SYSTOLIC BLOOD PRESSURE: 128 MMHG | HEART RATE: 84 BPM | BODY MASS INDEX: 36.31 KG/M2 | RESPIRATION RATE: 16 BRPM | WEIGHT: 274 LBS | HEIGHT: 73 IN | OXYGEN SATURATION: 97 % | DIASTOLIC BLOOD PRESSURE: 77 MMHG | TEMPERATURE: 98.7 F

## 2020-12-22 PROCEDURE — 74011250636 HC RX REV CODE- 250/636: Performed by: STUDENT IN AN ORGANIZED HEALTH CARE EDUCATION/TRAINING PROGRAM

## 2020-12-22 PROCEDURE — 74011250637 HC RX REV CODE- 250/637: Performed by: PSYCHIATRY & NEUROLOGY

## 2020-12-22 PROCEDURE — 99231 SBSQ HOSP IP/OBS SF/LOW 25: CPT | Performed by: PSYCHIATRY & NEUROLOGY

## 2020-12-22 PROCEDURE — 99239 HOSP IP/OBS DSCHRG MGMT >30: CPT | Performed by: INTERNAL MEDICINE

## 2020-12-22 RX ORDER — CALCIUM CARB/MAGNESIUM CARB 311-232MG
5 TABLET ORAL
Status: DISCONTINUED | OUTPATIENT
Start: 2020-12-22 | End: 2020-12-22 | Stop reason: HOSPADM

## 2020-12-22 RX ORDER — DIVALPROEX SODIUM 500 MG/1
1500 TABLET, EXTENDED RELEASE ORAL
Qty: 90 TAB | Refills: 0 | Status: SHIPPED | OUTPATIENT
Start: 2020-12-22

## 2020-12-22 RX ORDER — SERTRALINE HYDROCHLORIDE 50 MG/1
150 TABLET, FILM COATED ORAL EVERY EVENING
Qty: 30 TAB | Refills: 0 | Status: SHIPPED | OUTPATIENT
Start: 2020-12-22

## 2020-12-22 RX ADMIN — CARIPRAZINE 1.5 MG: 1.5 CAPSULE, GELATIN COATED ORAL at 10:18

## 2020-12-22 RX ADMIN — ENOXAPARIN SODIUM 40 MG: 40 INJECTION SUBCUTANEOUS at 10:18

## 2020-12-22 RX ADMIN — Medication 10 ML: at 10:19

## 2020-12-22 NOTE — PROGRESS NOTES
[address] [facility] [address]  Inpatient Progress Note     Date of Service: 12/22/20  Hospital Day: 3     Subjective/Interval History   12/22/20    Treatment Team Notes:  Notes reviewed and/or discussed and report that Stella Emery is a patient with a history of bipolar illness currently being followed by the undersigned in the [facility] after coming back with a positive Covid test result. Patient interview: The patient appears to be doing much better today. He remains rather compliant with treatment and following directions very appropriately. During the visit today the patient described that his father is trying to get him back home. He also mentioned that his friend is allowing him to go back to the same pair until he is able to return back to [place]. So he described that with a decrease of the stress created by his current difficulties he is not feeling suicidal anymore. It is also my understanding that from a medical point of view the patient's viral infection is not severe enough, and that he will be able to be discharge if not needing to be transferred to the psychiatric unit. The patient's friend is very well aware of the Covid test results being positive, however it appears that the patient could quarantine there are. If no of this is confirmed from our point of view the patient can be discharged with prescriptions for his current medications. He is doing very well with the current prescription for Vraylar, with the rest of his prescriptions being maintained as prior admission. Summary the patient is considered to be medically dischargeable I believe that the only prescription he will require is for 30 days of the atypical antipsychotic, however this needs to be confirmed with the patient himself. The above was discussed with nursing staff.       Objective     Visit Vitals  /77 (BP 1 Location: Left arm, BP Patient Position: Sitting)   Pulse 84   Temp 98.7 °F (37.1 °C) Resp 16   Ht 6' 1\" (1.854 m)   Wt 124.3 kg (274 lb)   SpO2 97%   BMI 36.15 kg/m²     Vitals are stable    No results found for this or any previous visit (from the past 24 hour(s)). Mental Status Examination     Appearance/Hygiene 23 y.o. WHITE OR  male  Hygiene: Fair   Behavior/Social Relatedness Appropriate   Musculoskeletal Gait/Station: appropriate  Tone (flaccid, cogwheeling, spastic): not assessed  Psychomotor (hyperkinetic, hypokinetic): calm   Involuntary movements (tics, dyskinesias, akathisa, stereotypies): none   Speech   Rate, rhythm, volume, fluency and articulation are appropriate   Mood   denies being depressed   Affect    appropriate to situation   Thought Process Linear and goal directed   Thought Content and Perceptual Disturbances Denies self-injurious behavior (SIB), suicidal ideation (SI), aggressive behavior or homicidal ideation (HI)    Denies auditory and visual hallucinations   Sensorium and Cognition  Grossly intact   Insight  fair   Judgment  fair        Assessment/Plan      Psychiatric Diagnoses:   Patient Active Problem List   Diagnosis Code    Suicidal ideation R45.851    COVID-19 U07.1    Bipolar I disorder, most recent episode depressed ([facility] [address] F31.30       Medical Diagnoses: Same    Psychosocial and contextual factors: Improving    Level of impairment/disability: Moderately severe    1. The patient is in agreement that if his depression worsens again he will be coming back to the emergency room to be evaluated. Otherwise he is hopeful that he will be able to return back home within the next several weeks. He has an outpatient psychiatrist there that he has seen for an extended period of time and whom will continue to provide care for the patient. 2.  Reviewed instructions, risks, benefits and side effects of medications  3. Disposition/Discharge Date: self-care/home, if agreeable with his current attending.     Hannah Banks MD, [facility]  Psychiatry

## 2020-12-22 NOTE — PROGRESS NOTES
Patient has been cleared by Psychiatry and Medicine. Spoke with patient who reports he is going to a friends home at [address] [place] [address], [id number]. Patient reports he has told the members of the home that he is COVID positive. Per medicaid portal:  [facility]#[id number]  Medicaid [place]#[id number]      Transport arranged through [employer] for 2:30pm.  Scripts being filled at outpatient pharmacy.       OSCAR Dasilva  Case Management  [phone]

## 2020-12-22 NOTE — DISCHARGE INSTRUCTIONS
DISCHARGE SUMMARY from Nurse    PATIENT INSTRUCTIONS:        What to do at Home:  Recommended activity: Activity as tolerated, use covid 19 precautions, self quarantine for 10 days, wear mask, good handwashing    If you experience any of the following symptoms chest pain, shortness of breath, nausea, vomiting, diarrhea, temps greater than 100.5, thoughts of suicide, please follow up with PCP for medical issues, ER/911 for emergency situation, crisis line or ER/911 for suicide thoughts    *  Please give a list of your current medications to your Primary Care Provider. *  Please update this list whenever your medications are discontinued, doses are      changed, or new medications (including over-the-counter products) are added. *  Please carry medication information at all times in case of emergency situations. These are general instructions for a healthy lifestyle:    No smoking/ No tobacco products/ Avoid exposure to second hand smoke  Surgeon General's Warning:  Quitting smoking now greatly reduces serious risk to your health. Obesity, smoking, and sedentary lifestyle greatly increases your risk for illness    A healthy diet, regular physical exercise & weight monitoring are important for maintaining a healthy lifestyle    You may be retaining fluid if you have a history of heart failure or if you experience any of the following symptoms:  Weight gain of 3 pounds or more overnight or 5 pounds in a week, increased swelling in our hands or feet or shortness of breath while lying flat in bed. Please call your doctor as soon as you notice any of these symptoms; do not wait until your next office visit. The discharge information has been reviewed with the patient. The patient verbalized understanding. Discharge medications reviewed with the patient and appropriate educational materials and side effects teaching were provided.     Patient armband removed and shredded    ___________________________________________________________________________________________________________________________________

## 2020-12-22 NOTE — DISCHARGE SUMMARY
Discharge Summary     Patient ID:  Moni Alberts  [id number]  23 y.o.  2000  Body mass index is 36.15 kg/m². PCP on record: None    Admit date: 12/19/2020  Discharge date and time: 12/22/2020    Discharge Diagnoses:                                           1 COVID-19 infection-asymptomatic  2 depression      Consults: Psychiatry          Hospital Course by problems:    -[place] live with his friend for what ever reason he got upset threatened to kill himself and she was brought into emergency room, screening COVID-19 test suggested patient has positive he is completely asymptomatic    -Psych was consulted-cleared the patient for discharge    -Patient now is going to his friend's place, he informed his friends that he is Covid positive I have instructed him regarding, self-isolation    -       Patient seen and examined by me on discharge day. Significant Diagnostic Studies:    Pertinent Findings:  SARS-COV-2 [[id number]] (Order [id number])  Lab  Date: 12/19/2020 Department: [address] Telemetry Released By/Authorizing: BOZENA Sharma (auto-released)   Component Value Flag Ref Range Units Status   Specimen source Nasopharyngeal      Final   COVID-19 rapid test Detected  Panic   NOTD   Final   Comment:        The specimen is POSITIVE for SARS-CoV-2, the novel coronavirus associated with COVID-19.         This test has been authorized by the FDA under an Emergency Use Authorization (EUA) for use by authorized laboratories.         Fact sheet for Healthcare Providers: ConventionUpdate.co.nz   Fact sheet for Patients: ConventionUpdate.co.nz         Methodology: Isothermal Nucleic Acid Amplification   Critical value verified.  Called to and read back by:   PAVAN DE GUZMAN, AT 1626 ON [id number] TO SITA                Pertinent Lab Data:  No results for input(s): WBC, HGB, HCT, PLT, HGBEXT, HCTEXT, PLTEXT, HGBEXT, HCTEXT, PLTEXT in the last 72 hours. Recent Labs     20  0136   ALB 3.4   ALT 41       DISCHARGE MEDICATIONS:   @  Current Discharge Medication List      START taking these medications    Details   cariprazine (VRAYLAR) 1.5 mg capsule Take 1 Cap by mouth daily. Indications: bipolar depression  Qty: 30 Cap, Refills: 0      divalproex ER (DEPAKOTE ER) 500 mg ER tablet Take 3 Tabs by mouth nightly. Indications: bipolar depression  Qty: 90 Tab, Refills: 0      sertraline (ZOLOFT) 50 mg tablet Take 3 Tabs by mouth every evening. Qty: 30 Tab, Refills: 0         STOP taking these medications       QUEtiapine (SEROqueL) 25 mg tablet Comments:   Reason for Stopping:                 My Recommended Diet, Activity, Wound Care, and follow-up labs are listed in the patient's Discharge Insturctions which I have personally completed and reviewed. Disposition:     [x] Home with family     [] [facility] PT/RN   [] SNF/NH   [] Inpatient Rehab/[facility]  Condition at Discharge:  Stable    Follow up with:   PCP : None      Please follow-up tests/labs that are still pendin. None  2.    >30 minutes spent coordinating this discharge (review instructions/follow-up, prescriptions, preparing report for sign off)    Disclaimer: Sections of this note are dictated utilizing voice recognition software, which may have resulted in some phonetic based errors in grammar and contents. Even though attempts were made to correct all the mistakes, some may have been missed, and remained in the body of the document. If questions arise, please contact our department.     Signed:  Tennille Boyd MD  2020  12:37 PM

## 2020-12-22 NOTE — PROGRESS NOTES
Received report on pt.from off going RN. Resting quietly in bed on rounds. Denies c/o pain or SOB at this time. Sitter at bedside. Suicide precautions maintained. No acute distress noted. Will cont to monitor for any changes in status. 1030 patient requesting that milk allergy be removed from his allergy list. He states that he is not allergic to milk. Request sent to pharmacy to have this removed. 1200 Dr Campos Innocent in to see pt. Suicide precautions d/c'd.     1430 pt given discharge instructions and filled  Prescriptions from outpatient pharmacy. 1500 discharged home. To exit ambulatory acc by medical transport to waiting van down stairs. No distress noted at time of discharge.

## 2020-12-22 NOTE — ROUTINE PROCESS
Bedside and Verbal shift change report given to [facility] RN (oncoming nurse) by Jan Lambert (off going nurse). Report included the following information, SBAR, MAR, VS and summary of care. Patient quietly resting, sitter observing.

## 2020-12-22 NOTE — SUICIDE SAFETY PLAN
SAFETY PLAN    A suicide Safety Plan is a document that supports someone when they are having thoughts of suicide. Warning Signs that indicate a suicidal crisis may be developing: What (situations, thoughts, feelings, body sensations, behaviors, etc.) do you experience that lets you know you are beginning to think about suicide? 1. Depression,  2.  withdrawn    Internal Coping Strategies:  What things can I do (relaxation techniques, hobbies, physical activities, etc.) to take my mind off my problems without contacting another person? 1. music  2. Playing music  3. Magnet fishing    People and social settings that provide distraction: Who can I call or where can I go to distract me? 1. Name: Yohan ( friend)  Phone: in contacts  2. Name: Mayur Modi   Phone: [phone]  3. Place:walks            4. Place: play guitar somewhere    People whom I can ask for help: Who can I call when I need help - for example, friends, family, clergy, someone else? 1. Name: michelle MEDEIROS                Phone: [phone]  2. Name:   Phone:   3. Name:   Phone:     Professionals or [address] [place] [address] Center [place] I can contact during a crisis: Who can I call for help - for example, my doctor, my psychiatrist, my psychologist, a mental health provider, a suicide hotline? 1. Clinician Name: crisis line in [place]   Phone: [phone] or Emergency Contact #:     2. Clinician Name: New therapist in [facility]   Phone:       Clinician Pager or Emergency Contact #:     3. Suicide Prevention Lifeline: 8-110-431-TALK (5903)    4. [address] [place] [address] Emergency Services -  for example, [facility] suicide hotline, [facility] Hotline:       Emergency Services Address:       Emergency Services Phone: [phone]    Making the environment safe: How can I make my environment (house/apartment/living space) safer? For example, can I remove guns, medications, and other items? 1.  No firearms  2.  No knifes

## 2020-12-22 NOTE — PROGRESS NOTES
Bedside shift change report given to [address] [place], [place] [address]way [address]2 (oncoming nurse) by Jazmine Tucker (offgoing nurse). Report included the following information SBAR, MAR, Recent Results, Med Rec Status and Cardiac Rhythm NSR.